# Patient Record
Sex: MALE | Race: WHITE | Employment: OTHER | URBAN - NONMETROPOLITAN AREA
[De-identification: names, ages, dates, MRNs, and addresses within clinical notes are randomized per-mention and may not be internally consistent; named-entity substitution may affect disease eponyms.]

---

## 2017-07-03 ENCOUNTER — HOSPITAL ENCOUNTER (OUTPATIENT)
Dept: GENERAL RADIOLOGY | Age: 62
Discharge: HOME | End: 2017-07-03
Attending: FAMILY MEDICINE | Admitting: FAMILY MEDICINE

## 2017-07-03 DIAGNOSIS — M25.562 ACUTE PAIN OF LEFT KNEE: ICD-10-CM

## 2018-01-17 ENCOUNTER — HOSPITAL ENCOUNTER (OUTPATIENT)
Dept: GENERAL RADIOLOGY | Age: 63
Discharge: HOME | End: 2018-01-17
Attending: FAMILY MEDICINE | Admitting: FAMILY MEDICINE

## 2018-01-17 DIAGNOSIS — R52 PAIN: ICD-10-CM

## 2018-04-05 ENCOUNTER — HOSPITAL ENCOUNTER (OUTPATIENT)
Dept: PHYSICAL THERAPY | Age: 63
Discharge: HOME | End: 2018-04-30

## 2018-05-01 ENCOUNTER — HOSPITAL ENCOUNTER (OUTPATIENT)
Dept: PHYSICAL THERAPY | Age: 63
Discharge: HOME | End: 2018-05-31

## 2018-06-01 ENCOUNTER — HOSPITAL ENCOUNTER (OUTPATIENT)
Dept: PHYSICAL THERAPY | Age: 63
Discharge: HOME | End: 2018-06-30

## 2018-06-01 NOTE — FLOWSHEET NOTE
Adverse reactions to treatment:      Equipment provided:      Assessment: Patient rated his pain . 5/10 after treatment. Gained 9* passive flexion, 2* passive scaption, 10* passive ER since last objective measures. Continue to progress per protocol.      Time In / Time Out:  1032/ 1135         Timed Code/Total Treatment Minutes: 61' ( 15' Vaso, 15' MT, 35' TE)  Patients Report of Tolerance:    [] Patient limited by fatigue        [x] Patient limited by pain   [] Patient limited by other medical complications   [] Other: per protocol    Prognosis:   [x] Good [] Fair  [] Poor    Plan:   [x] Continue per plan of care [] Alter current plan (see comments)  [] Plan of care initiated [] Hold pending MD visit [] Discharge    Plan for Next Session:   Continue     Next Progress Note due:            Electronically signed by:  Rosa Jerome PTA    6/1/2018, 10:38 AM

## 2018-07-01 ENCOUNTER — HOSPITAL ENCOUNTER (OUTPATIENT)
Dept: PHYSICAL THERAPY | Age: 63
Discharge: HOME | End: 2018-07-31

## 2018-08-01 ENCOUNTER — HOSPITAL ENCOUNTER (OUTPATIENT)
Dept: PHYSICAL THERAPY | Age: 63
Discharge: HOME | End: 2018-08-16

## 2019-08-28 ENCOUNTER — HOSPITAL ENCOUNTER (EMERGENCY)
Age: 64
Discharge: HOME | End: 2019-08-28
Payer: COMMERCIAL

## 2019-08-28 VITALS
DIASTOLIC BLOOD PRESSURE: 95 MMHG | HEART RATE: 112 BPM | RESPIRATION RATE: 16 BRPM | WEIGHT: 245 LBS | SYSTOLIC BLOOD PRESSURE: 122 MMHG | BODY MASS INDEX: 37.13 KG/M2 | HEIGHT: 68 IN | TEMPERATURE: 98.4 F | OXYGEN SATURATION: 98 %

## 2019-08-28 DIAGNOSIS — S41.112A LACERATION OF LEFT UPPER EXTREMITY, INITIAL ENCOUNTER: Primary | ICD-10-CM

## 2019-08-28 PROCEDURE — 4500000027

## 2019-08-28 PROCEDURE — 99282 EMERGENCY DEPT VISIT SF MDM: CPT

## 2019-08-28 NOTE — ED PROVIDER NOTES
- Procedure explained, including risks and benefits explained to the patient who expressed understanding. All questions were answered. Verbal consent obtained. - The Wound was prepped and draped in the usual sterile fashion using Betadine and sterile saline.  - Wound was explored to it's depth:    no foreign bodies. no compromise of neurovascular or tendon structures  - Wound was irrigated with copious amounts of sterile saline and mechanically debrided utilizing sterile gauze. - The laceration was Closed with 0 wound adhesive  - Hemostasis and good cosmesis was achieved. Blood loss minimal.  - The wound area was then dressed with Sterile nonstick dressing, sterile gauze, and tape. - Patient tolerated procedure well without complications. Post procedure exam of the affected region reveals distal sensation, motor, capillary refill, and pulses intact    Total repaired wound length: 1.2 cm  ________________________________________________________________________          ED COURSE & MEDICAL DECISION MAKING        I discussed possibility of infection, retained foreign body, tendon injury, nerve injury. Wound care instructions discussed with patient today. Wound check in 2-3 days. Return to emergency Department precautions were discussed in detail with patient who understands and agrees. Clinical  IMPRESSION    1. Laceration of left upper extremity, initial encounter              Comment: Please note this report has been produced using speech recognition software and may contain errors related to that system including errors in grammar, punctuation, and spelling, as well as words and phrases that may be inappropriate. If there are any questions or concerns please feel free to contact the dictating provider for clarification.          [place], [place]  08/28/19 0039

## 2023-08-15 ENCOUNTER — HOSPITAL ENCOUNTER (OUTPATIENT)
Dept: GENERAL RADIOLOGY | Age: 68
Discharge: HOME | End: 2023-08-15
Payer: MEDICARE

## 2023-08-15 ENCOUNTER — HOSPITAL ENCOUNTER (OUTPATIENT)
Age: 68
Discharge: HOME | End: 2023-08-15
Payer: MEDICARE

## 2023-08-15 DIAGNOSIS — M25.511 ACUTE PAIN OF RIGHT SHOULDER: ICD-10-CM

## 2023-08-15 PROCEDURE — 73030 X-RAY EXAM OF SHOULDER: CPT

## 2023-11-03 ENCOUNTER — HOSPITAL ENCOUNTER (OUTPATIENT)
Dept: PHYSICAL THERAPY | Age: 68
Setting detail: THERAPIES SERIES
Discharge: HOME | End: 2023-11-03
Payer: MEDICARE

## 2023-11-03 PROCEDURE — 97162 PT EVAL MOD COMPLEX 30 MIN: CPT

## 2023-11-03 NOTE — FLOWSHEET NOTE
Outpatient Physical Therapy  [place]           [] Phone: [phone]   Fax: [phone]  [facility]           [x] Phone: [phone]   Fax: [phone]        Physical Therapy Daily Treatment Note  Date:  11/3/2023    Patient Name:  Ирина Edmonds    :  1955  MRN: [id number]  Restrictions/Precautions:  sling x4 weeks except in PT (23), NO shoulder AROM x6 weeks (23),  NO biceps strengthening x8 weeks (12/15/23). Diagnosis:   Biceps tendon tear [S46.219A]    Date of Injury/Surgery:  rotator cuff surgery 10/20/23  Treatment Diagnosis:   M62.81 muscle weakness,   Insurance/Certification information:  [facility] Medicare  Referring Physician:  Iva Apgar, MD     PCP: Claire Betancur DO   Plan of care signed (Y/N):  eval faxed. Outcome Measure: QuickDASH: 50  Visit# / total visits:   1/  Pain level: 0/10   Goals:     Patient goals:  to get better    Short term goals to be achieved by December 15, 2023:  Short term goal 1: Pt will report compliance with current HEP as prescribed in order to improve  ROM and strength. Short term goal 2: Pt will demonstrate PROM R shoulder flexion to at least 120 degrees in order to improve ROM. Short term goal 3: Pt will demonstrate AROM R shoulder flexion to at least 100 degrees in order to improve ROM. Short term goal 4: Pt will demonstrate R elbow extension lacking no more than 5 degrees in order to improve ROM. Short term goal 5: Pt will demonstrate a QuickDASH of no more than 35 in order to improve quality of life. Subjective:  See eval     Any changes in Ambulatory Summary Sheet?   None    Objective:  PROM R shoulder flexion: 86 degrees in supine    Exercises: (No more than 4 columns)   Exercise/Equipment Date: 11/3/23 Date Date           WARM UP      pulley  begin                TABLE      hanging x1' x2'    Pendulums F/B 1x15 R UE     Pendulums lateral 1x15 R UE     Pendulums circles ea way 1x15 R UE     PROM stretching X5' into flexion

## 2023-11-03 NOTE — PROGRESS NOTES
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 G II CDM HD/GL  tend with medial sublux L HB. PT Impression: M62.81 muscle weakness,   Insurance: [employer] Medicare  Restrictions/Precautions: sling x4 weeks except in PT (23), NO shoulder AROM x6 weeks (23),  NO biceps strengthening x8 weeks (12/15/23). Subjective   Chart Reviewed: Yes   Patient assessed for rehabilitation services?: Yes   Family / Caregiver Present: no  Follows Commands: Within Functional Limits   Date of onset:  rotator cuff surgery 10/20/23  Subjective: Pt reports he wears his sling \"most of the time. \"  He reports he is doing well overall and does not have much pain. Current Situation: Pt reports he has been able to sleep, but will wake up with pain in the back of his shoulder at times. Observation: Pt arrived with sling on R UE and slight R shoulder elevation  Medication: updated in EMR    Pain Screening   Patient Currently in Pain:   Pain Assessment: 0-10   Pain Level: 0/10    Worst pain: 5/10  on back of shoulder when sleeping  Best pain:   0/10   Sensation: unimpaired    Vision/Hearing   Vision: impaired. Pt wears glasses  Hearing: impaired. Decreased hearing R ear    Home Living  Lives With: wife  Type of Home: house  Home Layout:  single story with basement  Equipment: sling  Work: retired from [employer]: golf, watching tv  Prior level of function:  pt reports he has had trouble for a couple years prior to surgery.    Patient goals: to get better    Orientation: WNL    Objective    AROM:   Shoulder:   Left: Seated Right    Flexion     153   degrees

## 2023-11-07 NOTE — PRE-CERTIFICATION NOTE
Insurance approved 11 physical therapy visits with the following cpt codes:     03091  39209  [id number] [id number]    Date range:  11/3/2023 - 1/31/2024    Auth#  [id number]

## 2023-11-08 ENCOUNTER — HOSPITAL ENCOUNTER (OUTPATIENT)
Dept: PHYSICAL THERAPY | Age: 68
Setting detail: THERAPIES SERIES
Discharge: HOME | End: 2023-11-08
Payer: MEDICARE

## 2023-11-08 PROCEDURE — 97110 THERAPEUTIC EXERCISES: CPT

## 2023-11-08 PROCEDURE — 97140 MANUAL THERAPY 1/> REGIONS: CPT

## 2023-11-08 NOTE — FLOWSHEET NOTE
Outpatient Physical Therapy  [place]           [] Phone: [phone]   Fax: [phone]  [facility]           [x] Phone: [phone]   Fax: [phone]        Physical Therapy Daily Treatment Note  Date:  2023    Patient Name:  Rambo Stallings    :  1955  MRN: [id number]  Restrictions/Precautions:  sling x4 weeks except in PT (23), NO shoulder AROM x6 weeks (23),  NO biceps strengthening x8 weeks (12/15/23). Diagnosis:   Biceps tendon tear [S46.219A]    Date of Injury/Surgery:  rotator cuff surgery 10/20/23  Treatment Diagnosis:   M62.81 muscle weakness,   Insurance/Certification information:  [facility] Medicare  Referring Physician:  Nakul Loyola MD     PCP: Jess Burgos DO   Plan of care signed (Y/N):  eval faxed. Outcome Measure: QuickDASH: 50  Visit# / total visits:   2/  Pain level: 0/10   Goals:     Patient goals:  to get better    Short term goals to be achieved by December 15, 2023:  Short term goal 1: Pt will report compliance with current HEP as prescribed in order to improve  ROM and strength. Short term goal 2: Pt will demonstrate PROM R shoulder flexion to at least 120 degrees in order to improve ROM. Short term goal 3: Pt will demonstrate AROM R shoulder flexion to at least 100 degrees in order to improve ROM. Short term goal 4: Pt will demonstrate R elbow extension lacking no more than 5 degrees in order to improve ROM. Short term goal 5: Pt will demonstrate a QuickDASH of no more than 35 in order to improve quality of life. Subjective:  denies any pain upon arrival for therapy; reports compliance with HEP    Any changes in Ambulatory Summary Sheet?   None    Objective:  PROM R shoulder flexion to ~90 deg    Exercises: (No more than 4 columns)   Exercise/Equipment Date: 11/3/23 Date 2023 Date           WARM UP      pulley  X10-15 reps                TABLE      hanging x1' X2' (towel under upper arm)    Pendulums F/B 1x15 R UE 1x15 R UE    Pendulums

## 2023-11-10 ENCOUNTER — HOSPITAL ENCOUNTER (OUTPATIENT)
Dept: PHYSICAL THERAPY | Age: 68
Setting detail: THERAPIES SERIES
Discharge: HOME | End: 2023-11-10
Payer: MEDICARE

## 2023-11-10 PROCEDURE — 97140 MANUAL THERAPY 1/> REGIONS: CPT

## 2023-11-10 PROCEDURE — 97110 THERAPEUTIC EXERCISES: CPT

## 2023-11-10 NOTE — FLOWSHEET NOTE
Outpatient Physical Therapy  [place]           [] Phone: [phone]   Fax: [phone]  [facility]           [x] Phone: [phone]   Fax: [phone]        Physical Therapy Daily Treatment Note  Date:  11/10/2023    Patient Name:  Km Vides    :  1955  MRN: [id number]  Restrictions/Precautions:  sling x4 weeks except in PT (23), NO shoulder AROM x6 weeks (23),  NO biceps strengthening x8 weeks (12/15/23). Diagnosis:   Biceps tendon tear [S46.219A]    Date of Injury/Surgery:  rotator cuff surgery 10/20/23  Treatment Diagnosis:   M62.81 muscle weakness,   Insurance/Certification information:  [facility] Medicare  Referring Physician:  Zenaida Ward MD     PCP: Kenny Castro DO   Plan of care signed (Y/N):  patal faxed. Outcome Measure: QuickDASH: 50  Visit# / total visits:   3/11  Pain level: 0/10   Goals:     Patient goals:  to get better    Short term goals to be achieved by December 15, 2023:  Short term goal 1: Pt will report compliance with current HEP as prescribed in order to improve  ROM and strength. Short term goal 2: Pt will demonstrate PROM R shoulder flexion to at least 120 degrees in order to improve ROM. Short term goal 3: Pt will demonstrate AROM R shoulder flexion to at least 100 degrees in order to improve ROM. Short term goal 4: Pt will demonstrate R elbow extension lacking no more than 5 degrees in order to improve ROM. Short term goal 5: Pt will demonstrate a QuickDASH of no more than 35 in order to improve quality of life. Subjective:  Pt reports no pain upon arrival and reports compliance with HEP. Pt reports that he did not have pain after the last tx session and has been taking his sling off occasionally at home. Any changes in Ambulatory Summary Sheet?   None    Objective:      Exercises: (No more than 4 columns)   Exercise/Equipment Date: 11/3/23 Date 2023 Date           WARM UP      pulley  X10-15 reps 2'                TABLE

## 2023-11-14 ENCOUNTER — HOSPITAL ENCOUNTER (OUTPATIENT)
Dept: PHYSICAL THERAPY | Age: 68
Setting detail: THERAPIES SERIES
Discharge: HOME | End: 2023-11-14
Payer: MEDICARE

## 2023-11-14 PROCEDURE — 97140 MANUAL THERAPY 1/> REGIONS: CPT

## 2023-11-14 PROCEDURE — 97110 THERAPEUTIC EXERCISES: CPT

## 2023-11-14 NOTE — FLOWSHEET NOTE
TABLE       hanging x1' X2' (towel under upper arm) 2' Not today   Pendulums F/B 1x15 R UE 1x15 R UE 2' 2'   Pendulums lateral 1x15 R UE 1x15 R UE 2' 2'   Pendulums circles ea way 1x15 R UE 1x15 R UE 2' 2'   PROM stretching X5' into flexion Shoulder flex; elbow flex x 2-3' ea  Shoulder flex; elbow flex   5' each Shoulder flex; elbow flex; Shoulder scaption  5' each          STANDING                                                             PROPRIOCEPTION                                          MODALITIES       Vaso prn                Other Therapeutic Activities/Education:  Pt educated on PT findings, plan, prognosis, and HEP. Pt provided with a handout. Home Exercise Program:  11/3 - pendulums all directions 3x15 daily    Manual Treatments:  PROM R shoulder flexion; elbow flex    Modalities:  none; ices at home    Communication with other providers:  matthew haque     Assessment:  (Response towards treatment session) (Pain Rating)  1/10 pain rating after session. Pt is going to go home and rest. Pt has not been compliant on wearing sling during the day. Plan for Next Session: Continue per POC     Time In / Time Out:  3:20-3:45 pm     Timed Code/Total Treatment Minutes:  25' min (1 TE; 1 Manual)    Next Progress Note due:  12/15/23    Plan of Care Interventions:  [x] Therapeutic Exercise  [x] Modalities:  [x] Therapeutic Activity     [] Ultrasound  [] Estim  [] Gait Training      [] Cervical Traction [] Lumbar Traction  [x] Neuromuscular Re-education    [] Cold/hotpack [] Iontophoresis   [x] Instruction in HEP      [x] Vasopneumatic   [] Dry Needling    [x] Manual Therapy               [] Aquatic Therapy              Electronically signed by: MARILEE Dickinson Lesley A. [place], JAY   11/14/2023, 3:22 PM

## 2023-11-17 ENCOUNTER — HOSPITAL ENCOUNTER (OUTPATIENT)
Dept: PHYSICAL THERAPY | Age: 68
Setting detail: THERAPIES SERIES
Discharge: HOME | End: 2023-11-17
Payer: MEDICARE

## 2023-11-17 PROCEDURE — 97140 MANUAL THERAPY 1/> REGIONS: CPT

## 2023-11-17 PROCEDURE — 97110 THERAPEUTIC EXERCISES: CPT

## 2023-11-17 NOTE — FLOWSHEET NOTE
Outpatient Physical Therapy  [place]           [] Phone: [phone]   Fax: [phone]  [facility]           [x] Phone: [phone]   Fax: [phone]        Physical Therapy Daily Treatment Note  Date:  2023    Patient Name:  Sanchez Queen    :  1955  MRN: [id number]  Restrictions/Precautions:  sling x4 weeks except in PT (23), NO shoulder AROM x6 weeks (23),  NO biceps strengthening x8 weeks (12/15/23). Diagnosis:   Biceps tendon tear [S46.219A]    Date of Injury/Surgery:  rotator cuff surgery 10/20/23  Treatment Diagnosis:   M62.81 muscle weakness,   Insurance/Certification information:  [facility] Medicare  Referring Physician:  Julio Monson MD     PCP: Rosita Davison DO   Plan of care signed (Y/N):  patal faxed. Outcome Measure: QuickDASH: 50  Visit# / total visits:     Pain level:2/10   Goals:     Patient goals:  to get better    Short term goals to be achieved by December 15, 2023:  Short term goal 1: Pt will report compliance with current HEP as prescribed in order to improve  ROM and strength. Short term goal 2: Pt will demonstrate PROM R shoulder flexion to at least 120 degrees in order to improve ROM. Short term goal 3: Pt will demonstrate AROM R shoulder flexion to at least 100 degrees in order to improve ROM. Short term goal 4: Pt will demonstrate R elbow extension lacking no more than 5 degrees in order to improve ROM. Short term goal 5: Pt will demonstrate a QuickDASH of no more than 35 in order to improve quality of life. Subjective:  Pt reports 2/10 pain upon arrival. Pt arrived not wearing sling today. Any changes in Ambulatory Summary Sheet?   None    Objective:  Pt performed all exercises on POC; had pain during PROM stretching    Exercises: (No more than 4 columns)   Exercise/Equipment Date: 11/3/23 Date 2023 Date:11/10/23 Date:  2023 Date: 23             WARM UP        pulley  X10-15 reps 2'  2'  2'                   TABLE

## 2023-11-28 ENCOUNTER — HOSPITAL ENCOUNTER (OUTPATIENT)
Dept: PHYSICAL THERAPY | Age: 68
Setting detail: THERAPIES SERIES
Discharge: HOME | End: 2023-11-28
Payer: MEDICARE

## 2023-11-28 PROCEDURE — 97110 THERAPEUTIC EXERCISES: CPT

## 2023-11-28 PROCEDURE — 97140 MANUAL THERAPY 1/> REGIONS: CPT

## 2023-11-28 NOTE — FLOWSHEET NOTE
Outpatient Physical Therapy  [place]           [] Phone: [phone]   Fax: [phone]  [facility]           [x] Phone: [phone]   Fax: [phone]        Physical Therapy Daily Treatment Note  Date:  2023    Patient Name:  Jessica Escudero    :  1955  MRN: [id number]  Restrictions/Precautions:  sling x4 weeks except in PT (23), NO shoulder AROM x6 weeks (23),  NO biceps strengthening x8 weeks (12/15/23). Diagnosis:   Biceps tendon tear [S46.219A]    Date of Injury/Surgery:  rotator cuff surgery 10/20/23  Treatment Diagnosis:   M62.81 muscle weakness,   Insurance/Certification information:  [employer] Medicare  Referring Physician:  Shoaib Weaver MD     PCP: Kei Rodas DO   Plan of care signed (Y/N):  eval faxed. Outcome Measure: QuickDASH: 50  Visit# / total visits:     Pain level:  1/10   Goals:     Patient goals:  to get better    Short term goals to be achieved by December 15, 2023:  Short term goal 1: Pt will report compliance with current HEP as prescribed in order to improve  ROM and strength. Short term goal 2: Pt will demonstrate PROM R shoulder flexion to at least 120 degrees in order to improve ROM. Short term goal 3: Pt will demonstrate AROM R shoulder flexion to at least 100 degrees in order to improve ROM. Short term goal 4: Pt will demonstrate R elbow extension lacking no more than 5 degrees in order to improve ROM. Short term goal 5: Pt will demonstrate a QuickDASH of no more than 35 in order to improve quality of life. Subjective:   Patient reports of 10 pain upon arrival and appears without his sling. Any changes in Ambulatory Summary Sheet?   None    Objective:  Pt performed all exercises on POC; had discomfort during ROM exercises    Exercises: (No more than 4 columns)   Exercise/Equipment Date: 11/3/23 Date 2023 Date Date:  2023  (6)             WARM UP        pulley  X10-15 reps 2'  2'  2'                   TABLE

## 2023-12-01 ENCOUNTER — HOSPITAL ENCOUNTER (OUTPATIENT)
Dept: PHYSICAL THERAPY | Age: 68
Discharge: HOME | End: 2023-12-01

## 2023-12-01 NOTE — FLOWSHEET NOTE
Physical Therapy  Cancellation/No-show Note  Patient Name:  Norbert Kocher  :  1955   Date:  2023  Cancelled visits to date: 0  No-shows to date: 0    For today's appointment patient:  [x]  Cancelled  []  Rescheduled appointment  []  No-show     Reason given by patient:  []  Patient ill  []  Conflicting appointment  []  No transportation    []  Conflict with work  []  No reason given  [x]  Other:  Pt reports scheduling conflict   Comments:      Electronically signed by:  Danny Corbin PT, DPT [id number]  2023, 10:57 AM

## 2023-12-04 ENCOUNTER — HOSPITAL ENCOUNTER (OUTPATIENT)
Dept: PHYSICAL THERAPY | Age: 68
Discharge: HOME | End: 2023-12-04

## 2023-12-04 NOTE — FLOWSHEET NOTE
Physical Therapy  Cancellation/No-show Note  Patient Name:  Sara Israel  :  1955   Date:  2023  Cancelled visits to date: 2  No-shows to date: 0    For today's appointment patient:  [x]  Cancelled  []  Rescheduled appointment  []  No-show     Reason given by patient:  []  Patient ill  []  Conflicting appointment  []  No transportation    []  Conflict with work  [x]  No reason given  []  Other:     Comments:      Electronically signed by:  Ricky Bajwa PTA   2023, 2:55 PM

## 2024-03-27 ENCOUNTER — HOSPITAL ENCOUNTER (OUTPATIENT)
Age: 69
Discharge: HOME | End: 2024-03-27
Payer: MEDICARE

## 2024-03-27 ENCOUNTER — HOSPITAL ENCOUNTER (OUTPATIENT)
Age: 69
End: 2024-03-27
Payer: MEDICARE

## 2024-03-27 PROCEDURE — 93005 ELECTROCARDIOGRAM TRACING: CPT | Performed by: FAMILY MEDICINE

## 2024-03-28 LAB
EKG ATRIAL RATE: 83 BPM
EKG DIAGNOSIS: NORMAL
EKG P AXIS: 57 DEGREES
EKG P-R INTERVAL: 174 MS
EKG Q-T INTERVAL: 366 MS
EKG QRS DURATION: 82 MS
EKG QTC CALCULATION (BAZETT): 430 MS
EKG R AXIS: 3 DEGREES
EKG T AXIS: 38 DEGREES
EKG VENTRICULAR RATE: 83 BPM

## 2024-04-26 ENCOUNTER — HOSPITAL ENCOUNTER (OUTPATIENT)
Dept: PHYSICAL THERAPY | Age: 69
Setting detail: THERAPIES SERIES
Discharge: HOME | End: 2024-04-26
Payer: MEDICARE

## 2024-04-26 PROCEDURE — 97162 PT EVAL MOD COMPLEX 30 MIN: CPT

## 2024-04-26 NOTE — FLOWSHEET NOTE
Outpatient Physical Therapy  [place]           [] Phone: [phone]   Fax: [phone]  [place]           [x] Phone: [phone]   Fax: [phone]        Physical Therapy Daily Treatment Note  Date:  2024    Patient Name:  Blake Estes    :  1955  MRN: [id number]  Restrictions/Precautions: fall risk  Diagnosis:   Encounter for other specified surgical aftercare [Z48.89]    Date of Injury/Surgery: Pt had a L TKA 24. Pt reports he did not stay the night and got [facility] until yesterday.   Treatment Diagnosis:   M62.81 muscle weakness, R26.89 abnormality of gait   Insurance/Certification information:  [facility] Medicare   Referring Physician:  Tee Guthrie MD     PCP: Gaurav Kidd DO  Plan of care signed (Y/N):  eval faxed  Outcome Measure: LEFs: 3380 = 41.25% ability = 58.75% disability.   Visit# / total visits:   1/  Pain level: 5/10   Goals:     Patient goals:  Pt reports he wants to get back to yardwork and is planning a vacation in 2 months to [place].     Short term goals to be achieved by May 26, 2024 :  Short term goal 1: Pt will report compliance with current HEP as prescribed in order to improve ROM and strength.   Short term goal 2: Pt will demonstrate AAROM L knee flexion to at least 120 degrees in order to improve ROM.   Short term goal 3: Pt will demonstrate AROM L knee extension to no more than a 2 degree lag in order to improve ROM.  Short term goal 4: Pt will demonstrate a LEFs score of no more than 50% disability in order to improve quality of life.     Subjective:  Pt reports he has been doing a lot of walking, but not really doing his HEP because he has such a bad feeling about exercises from being a kid.     Any changes in Ambulatory Summary Sheet?  None    Objective:  AROM  LE  Knee:     Left   Knee flexion      105  degrees   Knee extension       Lacking 10 degrees      Exercises: (No more than 4 columns)   Exercise/Equipment Date: 24 Date Date           WARM

## 2024-04-29 ENCOUNTER — HOSPITAL ENCOUNTER (OUTPATIENT)
Dept: PHYSICAL THERAPY | Age: 69
Setting detail: THERAPIES SERIES
Discharge: HOME | End: 2024-04-29
Payer: MEDICARE

## 2024-04-29 PROCEDURE — 97112 NEUROMUSCULAR REEDUCATION: CPT

## 2024-04-29 PROCEDURE — 97530 THERAPEUTIC ACTIVITIES: CPT

## 2024-04-29 PROCEDURE — 97110 THERAPEUTIC EXERCISES: CPT

## 2024-04-29 NOTE — PRE-CERTIFICATION NOTE
Patient approved for 14 visits (DOES NOT INCLUDE EVAL) from 4/26/24 to 7/25/24.    Cpt codes approved:    59340  88640  30825  [id number]  [id number]    Auth#  [id number]

## 2024-05-02 ENCOUNTER — HOSPITAL ENCOUNTER (OUTPATIENT)
Dept: PHYSICAL THERAPY | Age: 69
Setting detail: THERAPIES SERIES
Discharge: HOME | End: 2024-05-02
Payer: MEDICARE

## 2024-05-02 PROCEDURE — 97016 VASOPNEUMATIC DEVICE THERAPY: CPT

## 2024-05-02 PROCEDURE — 97110 THERAPEUTIC EXERCISES: CPT

## 2024-05-02 NOTE — FLOWSHEET NOTE
Outpatient Physical Therapy  [place]           [] Phone: [phone]   Fax: [phone]  [place]           [x] Phone: [phone]   Fax: [phone]        Physical Therapy Daily Treatment Note  Date:  2024    Patient Name:  Blake Estes    :  1955  MRN: [id number]  Restrictions/Precautions: fall risk  Diagnosis:   Encounter for other specified surgical aftercare [Z48.89]    Date of Injury/Surgery: Pt had a L TKA 24. Pt reports he did not stay the night and got [facility] until yesterday.   Treatment Diagnosis:   M62.81 muscle weakness, R26.89 abnormality of gait   Insurance/Certification information:  [facility] Medicare   Referring Physician:  Tee Guthrie MD     PCP: Gaurav Kidd DO  Plan of care signed (Y/N):  eval faxed  Outcome Measure: LEFs: 33/80 = 41.25% ability = 58.75% disability.   Visit# / total visits:   3/  Pain level: 5/10   Goals:     Patient goals:  Pt reports he wants to get back to yardwork and is planning a vacation in 2 months to [place].     Short term goals to be achieved by May 26, 2024 :  Short term goal 1: Pt will report compliance with current HEP as prescribed in order to improve ROM and strength.   Short term goal 2: Pt will demonstrate AAROM L knee flexion to at least 120 degrees in order to improve ROM.   Short term goal 3: Pt will demonstrate AROM L knee extension to no more than a 2 degree lag in order to improve ROM.  Short term goal 4: Pt will demonstrate a LEFs score of no more than 50% disability in order to improve quality of life.     Subjective:  Rates his pain 5/10 today.  Numb on the outside, but very tender in the inside of the knee.    Any changes in Ambulatory Summary Sheet?  None    Objective:  AROM  LE  Knee:     Left   Knee flexion     105* AAROM knee flexion    Knee extension       Lacking 10 degrees      Exercises: (No more than 4 columns)   Exercise/Equipment Date: 24 Date  2024 Date 24             WARM UP      NuStep    X10'

## 2024-05-06 ENCOUNTER — HOSPITAL ENCOUNTER (OUTPATIENT)
Dept: PHYSICAL THERAPY | Age: 69
Discharge: HOME | End: 2024-05-06

## 2024-05-06 NOTE — FLOWSHEET NOTE
Physical Therapy  Cancellation/No-show Note  Patient Name:  Blake Estes  :  1955   Date:  2024  Cancelled visits to date: 0  No-shows to date: 0    For today's appointment patient:  [x]  Cancelled  []  Rescheduled appointment  []  No-show     Reason given by patient:  []  Patient ill  []  Conflicting appointment  []  No transportation    []  Conflict with work  []  No reason given  [x]  Other:  Pt reports he went to the doctor today and they are sending him to the ED to check for a blood clot.    Comments:  Last visit  pt demonstrated increased swelling.  At that time PT examined LE and found significant edema, and shiny skin. Pt had a truck full of cut wood and reported he had been up on his feet working on it. Pt was provided vaso therapy and PT gave strict instructions to pt to keep his LE elevated and iced for the rest of the evening and if it was still as swollen in the morning PT instructed him to go straight to the ED to check for blood clots. Pt asked if he should go to his physician and PT said no, straight to the ED.     Electronically signed by:  Susan Pastor, PT,DPT [id number]  2024, 2:53 PM

## 2024-05-08 ENCOUNTER — HOSPITAL ENCOUNTER (OUTPATIENT)
Dept: ULTRASOUND IMAGING | Age: 69
Discharge: HOME | End: 2024-05-08
Payer: MEDICARE

## 2024-05-08 DIAGNOSIS — R22.42 MASS OF LOWER LEG, LEFT: ICD-10-CM

## 2024-05-08 PROCEDURE — 93971 EXTREMITY STUDY: CPT

## 2024-05-16 ENCOUNTER — HOSPITAL ENCOUNTER (OUTPATIENT)
Dept: PHYSICAL THERAPY | Age: 69
Setting detail: THERAPIES SERIES
Discharge: HOME | End: 2024-05-16
Payer: MEDICARE

## 2024-05-16 PROCEDURE — 97530 THERAPEUTIC ACTIVITIES: CPT

## 2024-05-16 PROCEDURE — 97110 THERAPEUTIC EXERCISES: CPT

## 2024-05-16 NOTE — FLOWSHEET NOTE
Outpatient Physical Therapy  [place]           [] Phone: [phone]   Fax: [phone]  [place]           [x] Phone: [phone]   Fax: [phone]        Physical Therapy Daily Treatment Note  Date:  2024    Patient Name:  Blake Estes    :  1955  MRN: [id number]  Restrictions/Precautions: fall risk  Diagnosis:   Encounter for other specified surgical aftercare [Z48.89]    Date of Injury/Surgery: Pt had a L TKA 24. Pt reports he did not stay the night and got [facility] until yesterday.   Treatment Diagnosis:   M62.81 muscle weakness, R26.89 abnormality of gait   Insurance/Certification information:  [facility] Medicare   Referring Physician:  Tee Guthrie MD     PCP: Gaurav Kidd DO  Plan of care signed (Y/N):  eval faxed  Outcome Measure: LEFs: 33/80 = 41.25% ability = 58.75% disability.   Visit# / total visits:   4/  Pain level: 4/10   Goals:     Patient goals:  Pt reports he wants to get back to yardwork and is planning a vacation in 2 months to [place].     Short term goals to be achieved by May 26, 2024 :  Short term goal 1: Pt will report compliance with current HEP as prescribed in order to improve ROM and strength.   Short term goal 2: Pt will demonstrate AAROM L knee flexion to at least 120 degrees in order to improve ROM.   Short term goal 3: Pt will demonstrate AROM L knee extension to no more than a 2 degree lag in order to improve ROM.  Short term goal 4: Pt will demonstrate a LEFs score of no more than 50% disability in order to improve quality of life.     Subjective:   patient reports of 4/10 pain upon arrival and feels his knee tightened up on him after mowing today          Any changes in Ambulatory Summary Sheet?  None    Objective:  AROM  LE  Knee:     Left   Knee flexion     101* AAROM knee flexion    Knee extension       Lacking 10 degrees      Exercises: (No more than 4 columns)   Exercise/Equipment Date: 24 Date  2024 Date 24

## 2024-05-20 ENCOUNTER — HOSPITAL ENCOUNTER (OUTPATIENT)
Dept: PHYSICAL THERAPY | Age: 69
Setting detail: THERAPIES SERIES
Discharge: HOME | End: 2024-05-20
Payer: MEDICARE

## 2024-05-20 PROCEDURE — 97530 THERAPEUTIC ACTIVITIES: CPT

## 2024-05-20 PROCEDURE — 97110 THERAPEUTIC EXERCISES: CPT

## 2024-05-20 NOTE — FLOWSHEET NOTE
Outpatient Physical Therapy  [place]           [] Phone: [phone]   Fax: [phone]  [place]           [x] Phone: [phone]   Fax: [phone]        Physical Therapy Daily Treatment Note  Date:  2024    Patient Name:  Blake Estes    :  1955  MRN: [id number]  Restrictions/Precautions: fall risk  Diagnosis:   Encounter for other specified surgical aftercare [Z48.89]    Date of Injury/Surgery: Pt had a L TKA 24. Pt reports he did not stay the night and got [facility] until yesterday.   Treatment Diagnosis:   M62.81 muscle weakness, R26.89 abnormality of gait   Insurance/Certification information:  [facility] Medicare   Referring Physician:  Tee Guthrie MD     PCP: Gaurav Kidd DO  Plan of care signed (Y/N):  eval faxed  Outcome Measure: LEFs: 3380 = 41.25% ability = 58.75% disability.   Visit# / total visits:  5/  Pain level: 2-3/10   Goals:     Patient goals:  Pt reports he wants to get back to yardwork and is planning a vacation in 2 months to [place].     Short term goals to be achieved by May 26, 2024 :  Short term goal 1: Pt will report compliance with current HEP as prescribed in order to improve ROM and strength.   Short term goal 2: Pt will demonstrate AAROM L knee flexion to at least 120 degrees in order to improve ROM.   Short term goal 3: Pt will demonstrate AROM L knee extension to no more than a 2 degree lag in order to improve ROM.  Short term goal 4: Pt will demonstrate a LEFs score of no more than 50% disability in order to improve quality of life.     Subjective:   patient reports of 2-3/10 pain upon arrival and feels his knee tightened up on him after mowing            Any changes in Ambulatory Summary Sheet?  None    Objective:  AROM  LE  Knee:     Left   Knee flexion     105* AAROM knee flexion    Knee extension       Lacking 10 degrees      Exercises: (No more than 4 columns)   Exercise/Equipment Date 24  (5)           WARM UP      NuStep

## 2024-05-22 ENCOUNTER — HOSPITAL ENCOUNTER (OUTPATIENT)
Dept: PHYSICAL THERAPY | Age: 69
Setting detail: THERAPIES SERIES
Discharge: HOME | End: 2024-05-22
Payer: MEDICARE

## 2024-05-22 PROCEDURE — 97530 THERAPEUTIC ACTIVITIES: CPT

## 2024-05-22 PROCEDURE — 97110 THERAPEUTIC EXERCISES: CPT

## 2024-05-22 NOTE — FLOWSHEET NOTE
Outpatient Physical Therapy  [place]           [] Phone: [phone]   Fax: [phone]  [place]           [x] Phone: [phone]   Fax: [phone]        Physical Therapy Daily Treatment Note  Date:  2024    Patient Name:  Blake Estes    :  1955  MRN: [id number]  Restrictions/Precautions: fall risk  Diagnosis:   Encounter for other specified surgical aftercare [Z48.89]    Date of Injury/Surgery: Pt had a L TKA 24. Pt reports he did not stay the night and got [facility] until yesterday.   Treatment Diagnosis:   M62.81 muscle weakness, R26.89 abnormality of gait   Insurance/Certification information:  [facility] Medicare   Referring Physician:  Tee Guthrie MD     PCP: Gaurav Kidd DO  Plan of care signed (Y/N):  eval faxed  Outcome Measure: LEFs: 3380 = 41.25% ability = 58.75% disability.   Visit# / total visits:  6/  Pain level: 5/10   Goals:     Patient goals:  Pt reports he wants to get back to yardwork and is planning a vacation in 2 months to [place].     Short term goals to be achieved by May 26, 2024 :  Short term goal 1: Pt will report compliance with current HEP as prescribed in order to improve ROM and strength.   Short term goal 2: Pt will demonstrate AAROM L knee flexion to at least 120 degrees in order to improve ROM.   Short term goal 3: Pt will demonstrate AROM L knee extension to no more than a 2 degree lag in order to improve ROM.  Short term goal 4: Pt will demonstrate a LEFs score of no more than 50% disability in order to improve quality of life.     Subjective:   Patient rates his knee pain 5/10.  Can't think of anything that caused his increased pain.             Any changes in Ambulatory Summary Sheet?  None    Objective:  AROM  LE  Knee:     Left   Knee flexion     108* AAROM knee flexion    Knee extension       Lacking 10 degrees      Exercises: (No more than 4 columns)   Exercise/Equipment 24  (5) 2024           WARM UP      NuStep    S10 Lv4

## 2024-05-28 ENCOUNTER — HOSPITAL ENCOUNTER (OUTPATIENT)
Dept: PHYSICAL THERAPY | Age: 69
Setting detail: THERAPIES SERIES
Discharge: HOME | End: 2024-05-28
Payer: MEDICARE

## 2024-05-28 PROCEDURE — 97110 THERAPEUTIC EXERCISES: CPT

## 2024-05-28 PROCEDURE — 97530 THERAPEUTIC ACTIVITIES: CPT

## 2024-05-28 NOTE — FLOWSHEET NOTE
Outpatient Physical Therapy  [place]           [] Phone: [phone]   Fax: [phone]  [place]           [x] Phone: [phone]   Fax: [phone]        Physical Therapy Daily Treatment Note  Date:  2024    Patient Name:  Blake Estes    :  1955  MRN: [id number]  Restrictions/Precautions: fall risk  Diagnosis:   Encounter for other specified surgical aftercare [Z48.89]    Date of Injury/Surgery: Pt had a L TKA 24. Pt reports he did not stay the night and got [facility] until yesterday.   Treatment Diagnosis:   M62.81 muscle weakness, R26.89 abnormality of gait   Insurance/Certification information:  [facility] Medicare   Referring Physician:  Tee Guthrie MD     PCP: Gaurav Kidd DO  Plan of care signed (Y/N):  eval faxed  Outcome Measure: LEFs: 3380 = 41.25% ability = 58.75% disability.   Visit# / total visits:  7/  Pain level: 3-4/10   Goals:     Patient goals:  Pt reports he wants to get back to yardwork and is planning a vacation in 2 months to [place].     Short term goals to be achieved by May 26, 2024 :  Short term goal 1: Pt will report compliance with current HEP as prescribed in order to improve ROM and strength.   Short term goal 2: Pt will demonstrate AAROM L knee flexion to at least 120 degrees in order to improve ROM.   Short term goal 3: Pt will demonstrate AROM L knee extension to no more than a 2 degree lag in order to improve ROM.  Short term goal 4: Pt will demonstrate a LEFs score of no more than 50% disability in order to improve quality of life.     Subjective:   Patient rates his knee pain 3-410. Pt reports his nerve pain came back on the medial side of his knee. He reports it is interrupting his sleep because he cannot stand for anything to touch it.     Any changes in Ambulatory Summary Sheet?  None    Objective:  AROM  LE  Knee:     Left   Knee flexion     109* AAROM knee flexion    Knee extension       Lacking 10 degrees      Exercises: (No more than 4

## 2024-05-30 ENCOUNTER — HOSPITAL ENCOUNTER (OUTPATIENT)
Dept: PHYSICAL THERAPY | Age: 69
Setting detail: THERAPIES SERIES
Discharge: HOME | End: 2024-05-30
Payer: MEDICARE

## 2024-05-30 PROCEDURE — 97530 THERAPEUTIC ACTIVITIES: CPT

## 2024-05-30 PROCEDURE — 97110 THERAPEUTIC EXERCISES: CPT

## 2024-05-30 NOTE — FLOWSHEET NOTE
Outpatient Physical Therapy  [place]           [] Phone: [phone]   Fax: [phone]  [place]           [x] Phone: [phone]   Fax: [phone]        Physical Therapy Daily Treatment Note  Date:  2024    Patient Name:  Blake Estes    :  1955  MRN: [id number]  Restrictions/Precautions: fall risk  Diagnosis:   Encounter for other specified surgical aftercare [Z48.89]    Date of Injury/Surgery: Pt had a L TKA 24. Pt reports he did not stay the night and got [facility] until yesterday.   Treatment Diagnosis:   M62.81 muscle weakness, R26.89 abnormality of gait   Insurance/Certification information:  [facility] Medicare   Referring Physician:  Tee Guthrie MD     PCP: Gaurav Kidd DO  Plan of care signed (Y/N):  eval faxed  Outcome Measure: LEFs: 3380 = 41.25% ability = 58.75% disability.   Visit# / total visits:  7/  Pain level: 3-10   Goals:     Patient goals:  Pt reports he wants to get back to yardwork and is planning a vacation in 2 months to [place].     Short term goals to be achieved by May 26, 2024 :  Short term goal 1: Pt will report compliance with current HEP as prescribed in order to improve ROM and strength.   Short term goal 2: Pt will demonstrate AAROM L knee flexion to at least 120 degrees in order to improve ROM.   Short term goal 3: Pt will demonstrate AROM L knee extension to no more than a 2 degree lag in order to improve ROM.  Short term goal 4: Pt will demonstrate a LEFs score of no more than 50% disability in order to improve quality of life.     Subjective:  Patient denies any pain in the shoulder.  Has had her leg propped up all day which helped with the swelling.      Any changes in Ambulatory Summary Sheet?  None    Objective:  AROM  LE  Knee:     Left   Knee flexion     116* AAROM knee flexion    Knee extension       Lacking 10 degrees      Exercises: (No more than 4 columns)   Exercise/Equipment 2024 Date: 24           WARM UP

## 2024-06-04 ENCOUNTER — HOSPITAL ENCOUNTER (OUTPATIENT)
Dept: PHYSICAL THERAPY | Age: 69
Setting detail: THERAPIES SERIES
Discharge: HOME | End: 2024-06-04
Payer: MEDICARE

## 2024-06-04 PROCEDURE — 97110 THERAPEUTIC EXERCISES: CPT

## 2024-06-04 PROCEDURE — 97530 THERAPEUTIC ACTIVITIES: CPT

## 2024-06-04 NOTE — FLOWSHEET NOTE
Vasopneumatic   [] Dry Needling    [] Manual Therapy               [] Aquatic Therapy              Electronically signed by:  Laura Montgomery PTA    6/4/2024, 4:21 PM   06/04/24 4:21 PM

## 2024-06-06 ENCOUNTER — HOSPITAL ENCOUNTER (OUTPATIENT)
Dept: PHYSICAL THERAPY | Age: 69
Setting detail: THERAPIES SERIES
Discharge: HOME | End: 2024-06-06
Payer: MEDICARE

## 2024-06-06 PROCEDURE — 97110 THERAPEUTIC EXERCISES: CPT

## 2024-06-06 NOTE — FLOWSHEET NOTE
Outpatient Physical Therapy  [place]           [] Phone: [phone]   Fax: [phone]  [place]           [x] Phone: [phone]   Fax: [phone]        Physical Therapy Daily Treatment Note  Date:  2024    Patient Name:  Blake Estes    :  1955  MRN: [id number]  Restrictions/Precautions: fall risk  Diagnosis:   Encounter for other specified surgical aftercare [Z48.89]    Date of Injury/Surgery: Pt had a L TKA 24. Pt reports he did not stay the night and got [facility] until yesterday.   Treatment Diagnosis:   M62.81 muscle weakness, R26.89 abnormality of gait   Insurance/Certification information:  [facility] Medicare   Referring Physician:  Tee Guthrie MD     PCP: Gaurav Kidd DO  Plan of care signed (Y/N):  eval faxed  Outcome Measure: LEFs: 33 = 41.25% ability = 58.75% disability.   Visit# / total visits:  7/  Pain level: 3-10   Goals:     Patient goals:  Pt reports he wants to get back to yardwork and is planning a vacation in 2 months to [place].     Short term goals to be achieved by May 26, 2024 :  Short term goal 1: Pt will report compliance with current HEP as prescribed in order to improve ROM and strength.   Short term goal 2: Pt will demonstrate AAROM L knee flexion to at least 120 degrees in order to improve ROM.   Short term goal 3: Pt will demonstrate AROM L knee extension to no more than a 2 degree lag in order to improve ROM.  Short term goal 4: Pt will demonstrate a LEFs score of no more than 50% disability in order to improve quality of life.     Subjective:  Patient states that he pushed his foot down on his mower the other day to raise the mower deck and had instant pain in the knee.      Any changes in Ambulatory Summary Sheet?  None    Objective:  LEFS: 44/80    AROM  LE  Knee:     Left   Knee flexion     118* AAROM knee flexion with PT OP   Knee extension       Lacking 8 degrees      Exercises: (No more than 4 columns)   Exercise/Equipment Date: 24

## 2024-06-10 ENCOUNTER — HOSPITAL ENCOUNTER (OUTPATIENT)
Dept: PHYSICAL THERAPY | Age: 69
Setting detail: THERAPIES SERIES
Discharge: HOME | End: 2024-06-10
Payer: MEDICARE

## 2024-06-10 PROCEDURE — 97112 NEUROMUSCULAR REEDUCATION: CPT

## 2024-06-10 PROCEDURE — 97110 THERAPEUTIC EXERCISES: CPT

## 2024-06-10 NOTE — FLOWSHEET NOTE
Outpatient Physical Therapy  [place]           [] Phone: [phone]   Fax: [phone]  [place]           [x] Phone: [phone]   Fax: [phone]        Physical Therapy Daily Treatment Note  Date:  6/10/2024    Patient Name:  Blake Estes    :  1955  MRN: [id number]  Restrictions/Precautions: fall risk  Diagnosis:   Encounter for other specified surgical aftercare [Z48.89]    Date of Injury/Surgery: Pt had a L TKA 24. Pt reports he did not stay the night and got [facility] until yesterday.   Treatment Diagnosis:   M62.81 muscle weakness, R26.89 abnormality of gait   Insurance/Certification information:  [facility] Medicare   Referring Physician:  Tee Guthrie MD     PCP: Gaurav Kidd DO  Plan of care signed (Y/N):  eval faxed  Outcome Measure: LEFs: 3380 = 41.25% ability = 58.75% disability.   Visit# / total visits:  10/10  Pain level: 1/10   Goals:     Patient goals:  Pt reports he wants to get back to yardwork and is planning a vacation in 2 months to [place].     Short term goals to be achieved by May 26, 2024 :  Short term goal 1: Pt will report compliance with current HEP as prescribed in order to improve ROM and strength.   Short term goal 2: Pt will demonstrate AAROM L knee flexion to at least 120 degrees in order to improve ROM.   Short term goal 3: Pt will demonstrate AROM L knee extension to no more than a 2 degree lag in order to improve ROM.  Short term goal 4: Pt will demonstrate a LEFs score of no more than 50% disability in order to improve quality of life.     Subjective:  Patient states that he pushed his foot down on his mower the other day to raise the mower deck and had instant pain in the knee.      Any changes in Ambulatory Summary Sheet?  None    Objective:  LEFS: 44/80    AROM  LE  Knee:     Left   Knee flexion     120* AAROM knee flexion with PT OP  AROM: 110 degrees   Knee extension       Lacking 8 degrees      Exercises: (No more than 4 columns)

## 2024-06-12 ENCOUNTER — HOSPITAL ENCOUNTER (OUTPATIENT)
Dept: PHYSICAL THERAPY | Age: 69
Setting detail: THERAPIES SERIES
Discharge: HOME | End: 2024-06-12
Payer: MEDICARE

## 2024-06-12 PROCEDURE — 97110 THERAPEUTIC EXERCISES: CPT

## 2024-06-12 NOTE — FLOWSHEET NOTE
Outpatient Physical Therapy  [place]           [] Phone: [phone]   Fax: [phone]  [place]           [x] Phone: [phone]   Fax: [phone]        Physical Therapy Daily Treatment Note  Date:  2024    Patient Name:  Blake Estes    :  1955  MRN: [id number]  Restrictions/Precautions: fall risk  Diagnosis:   Encounter for other specified surgical aftercare [Z48.89]    Date of Injury/Surgery: Pt had a L TKA 24. Pt reports he did not stay the night and got [facility] until yesterday.   Treatment Diagnosis:   M62.81 muscle weakness, R26.89 abnormality of gait   Insurance/Certification information:  [facility] Medicare   Referring Physician:  Tee Guthrie MD     PCP: Gaurav Kidd DO  Plan of care signed (Y/N):  eval faxed  Outcome Measure: LEFs: 33/80 = 41.25% ability = 58.75% disability.   Visit# / total visits:  11/10  Pain level: 2/10   Goals:     Patient goals:  Pt reports he wants to get back to yardwork and is planning a vacation in 2 months to [place].     Short term goals to be achieved by May 26, 2024 :  Short term goal 1: Pt will report compliance with current HEP as prescribed in order to improve ROM and strength.   Short term goal 2: Pt will demonstrate AAROM L knee flexion to at least 120 degrees in order to improve ROM.   Short term goal 3: Pt will demonstrate AROM L knee extension to no more than a 2 degree lag in order to improve ROM.  Short term goal 4: Pt will demonstrate a LEFs score of no more than 50% disability in order to improve quality of life.     Subjective:  Patient reports of a nagging pain last night that kept him from sleeping well and rates his pain today at a 2/10.       Any changes in Ambulatory Summary Sheet?  None    Objective:  LEFS: 50/80    AROM  LE  Knee:     Left   Knee flexion     120* AAROM knee flexion with PT OP  AROM: 110 degrees   Knee extension       Lacking 8 degrees      Exercises: (No more than 4 columns)   Exercise/Equipment 2024

## 2025-08-13 ENCOUNTER — TRANSCRIBE ORDERS (OUTPATIENT)
Dept: ADMINISTRATIVE | Age: 70
End: 2025-08-13

## 2025-08-13 DIAGNOSIS — I71.40 ABDOMINAL AORTIC ANEURYSM (AAA) WITHOUT RUPTURE, UNSPECIFIED PART: Primary | ICD-10-CM

## 2025-08-19 ENCOUNTER — HOSPITAL ENCOUNTER (OUTPATIENT)
Dept: ULTRASOUND IMAGING | Age: 70
Discharge: HOME | End: 2025-08-19
Payer: MEDICARE

## 2025-08-19 DIAGNOSIS — I71.40 ABDOMINAL AORTIC ANEURYSM (AAA) WITHOUT RUPTURE, UNSPECIFIED PART: ICD-10-CM

## 2025-08-19 PROCEDURE — 93978 VASCULAR STUDY: CPT
